# Patient Record
Sex: FEMALE
[De-identification: names, ages, dates, MRNs, and addresses within clinical notes are randomized per-mention and may not be internally consistent; named-entity substitution may affect disease eponyms.]

---

## 2019-04-16 PROBLEM — Z00.00 ENCOUNTER FOR PREVENTIVE HEALTH EXAMINATION: Status: ACTIVE | Noted: 2019-04-16

## 2019-04-30 ENCOUNTER — APPOINTMENT (OUTPATIENT)
Dept: PLASTIC SURGERY | Facility: CLINIC | Age: 39
End: 2019-04-30
Payer: COMMERCIAL

## 2019-04-30 VITALS
SYSTOLIC BLOOD PRESSURE: 127 MMHG | HEART RATE: 83 BPM | WEIGHT: 155 LBS | HEIGHT: 61 IN | DIASTOLIC BLOOD PRESSURE: 84 MMHG | BODY MASS INDEX: 29.27 KG/M2

## 2019-04-30 DIAGNOSIS — L90.5 SCAR CONDITIONS AND FIBROSIS OF SKIN: ICD-10-CM

## 2019-04-30 DIAGNOSIS — Z85.3 PERSONAL HISTORY OF MALIGNANT NEOPLASM OF BREAST: ICD-10-CM

## 2019-04-30 DIAGNOSIS — N64.89 OTHER SPECIFIED DISORDERS OF BREAST: ICD-10-CM

## 2019-04-30 DIAGNOSIS — Z15.09 GENETIC SUSCEPTIBILITY TO MALIGNANT NEOPLASM OF BREAST: ICD-10-CM

## 2019-04-30 DIAGNOSIS — Z90.13 ACQUIRED ABSENCE OF BILATERAL BREASTS AND NIPPLES: ICD-10-CM

## 2019-04-30 DIAGNOSIS — Z15.01 GENETIC SUSCEPTIBILITY TO MALIGNANT NEOPLASM OF BREAST: ICD-10-CM

## 2019-04-30 DIAGNOSIS — N65.0 DEFORMITY OF RECONSTRUCTED BREAST: ICD-10-CM

## 2019-04-30 PROCEDURE — 99205 OFFICE O/P NEW HI 60 MIN: CPT

## 2019-04-30 RX ORDER — ANASTROZOLE TABLETS 1 MG/1
1 TABLET ORAL
Refills: 0 | Status: ACTIVE | COMMUNITY

## 2019-05-21 PROBLEM — L90.5 SCAR ADHERENT: Status: ACTIVE | Noted: 2019-05-21

## 2019-05-21 PROBLEM — N65.0 BREAST RECONSTRUCTION DEFORMITY: Status: ACTIVE | Noted: 2019-05-21

## 2019-05-21 PROBLEM — Z85.3 HISTORY OF BREAST CANCER: Status: ACTIVE | Noted: 2019-05-21

## 2019-05-21 PROBLEM — Z90.13 ABSENCE OF BREAST, ACQUIRED, BILATERAL: Status: ACTIVE | Noted: 2019-05-21

## 2019-05-21 NOTE — PHYSICAL EXAM
[NI] : Normal [Bra Size: _______] : Bra Size: [unfilled] [de-identified] : s/p bilateral mastectomy with midline transverse incision, step off deformity and decolletage area L >> R, bilateral nipples surgically absent, increased lateral fullness on the right breast, bottoming out of right breast, hypertrophic scar on right > left breast, no masses palpable,  [de-identified] : hypertrophic phthaleins incision, excess skin laxity, stigmata from prior liposuction

## 2019-05-21 NOTE — HISTORY OF PRESENT ILLNESS
[FreeTextEntry1] : 38 y/o F, BRCA2 positive, referred for consultation regarding her options for delayed breast reconstruction. History of stage IIB/III, ER positive, CO negative, HER2 negative, left breast cancer in 2017 treated at [facility] in [place] with neoadjuvant chemotherapy (8 cycles, Dr. Radha Murphy), followed by left mastectomy and ALND, right prophylactic mastectomy (Dr. Britt Xie) and b/l TE reconstruction (Dr. Onur Turk) in 4/2018, s/p post mastectomy radiation (completed 7/31/2018, Dr. Maribell Sky), s/p combined DAMARIS/BSO (Dr. Helio Black) and tissue expander removal with implant exchange on 1/2/2019 with Dr. Turk, bilatearl submuscular, Allergan SCX 615cc smooth implants,\par \par Here because she desires nipple reconstruction and was told by Dr. Turk she was not a candidate and would only be able to have tattoo. She is also interested in abdominoplasty.

## 2019-05-21 NOTE — ASSESSMENT
[FreeTextEntry1] : I reviewed with the patient the risks and benefits of revision surgery and nipple recon as well as Abdominoplasty. She is a good candidate for: \par \par 1) b/l Nipple recon\par 2) fat grafting to step off deformity and decolletage area L >> R from thighs from redundant mastectomy skin\par 3) scar revision\par 4) liposuction axillary fold\par \par patient also interested in abdominoplasty I reviewed with MICHAEL the risks and benefits of abdominoplasty. I explained that the goal is to excise the excess abdominal skin laxity / pannus and at the same time plicate the rectus fascia to correct the diastasis and narrow the waist and improve the contour of the abdomen. we can transpose the umbilicus in a full abdominoplasty or leave it in place in a mini abdominoplasty. In certain circumstances we "float the umbilicus" . the operation takes ~ 2.5 hours and there are usually surgical drains placed at the end of the surgery that stay in for ~ 1 - 2 weeks. The recovery is 2 off of work, 4 weeks avoiding any strenuous activities and 8 weeks avoiding any high impact exercise. The goal of the operation is to improve the abdominal contour and sometimes it is necessary to combine this procedure with flank, hip or upper abdominal liposuction. the risks include: bleeding, infection, delayed healing, loss of the umbilicus, contour irregularity. wound dehiscence, suboptimal scarring, seroma and loss of sensation.\par \par